# Patient Record
Sex: MALE | Race: WHITE
[De-identification: names, ages, dates, MRNs, and addresses within clinical notes are randomized per-mention and may not be internally consistent; named-entity substitution may affect disease eponyms.]

---

## 2017-08-08 ENCOUNTER — RECORDS - HEALTHEAST (OUTPATIENT)
Dept: ADMINISTRATIVE | Facility: OTHER | Age: 82
End: 2017-08-08

## 2017-08-10 ENCOUNTER — RECORDS - HEALTHEAST (OUTPATIENT)
Dept: ADMINISTRATIVE | Facility: OTHER | Age: 82
End: 2017-08-10

## 2018-06-03 ENCOUNTER — RADIANT APPOINTMENT (OUTPATIENT)
Dept: GENERAL RADIOLOGY | Facility: CLINIC | Age: 83
End: 2018-06-03
Attending: INTERNAL MEDICINE
Payer: MEDICARE

## 2018-06-03 ENCOUNTER — OFFICE VISIT (OUTPATIENT)
Dept: URGENT CARE | Facility: URGENT CARE | Age: 83
End: 2018-06-03
Payer: MEDICARE

## 2018-06-03 VITALS
TEMPERATURE: 97.9 F | DIASTOLIC BLOOD PRESSURE: 100 MMHG | BODY MASS INDEX: 23.56 KG/M2 | SYSTOLIC BLOOD PRESSURE: 160 MMHG | RESPIRATION RATE: 16 BRPM | WEIGHT: 120 LBS | HEART RATE: 110 BPM | OXYGEN SATURATION: 99 % | HEIGHT: 60 IN

## 2018-06-03 DIAGNOSIS — I10 UNCONTROLLED HYPERTENSION: ICD-10-CM

## 2018-06-03 DIAGNOSIS — M79.672 LEFT FOOT PAIN: ICD-10-CM

## 2018-06-03 DIAGNOSIS — T14.8XXA BRUISING: ICD-10-CM

## 2018-06-03 DIAGNOSIS — M79.672 LEFT FOOT PAIN: Primary | ICD-10-CM

## 2018-06-03 PROCEDURE — 99203 OFFICE O/P NEW LOW 30 MIN: CPT | Performed by: INTERNAL MEDICINE

## 2018-06-03 PROCEDURE — 73630 X-RAY EXAM OF FOOT: CPT | Mod: LT

## 2018-06-03 ASSESSMENT — ENCOUNTER SYMPTOMS: FEVER: 0

## 2018-06-03 NOTE — MR AVS SNAPSHOT
After Visit Summary   6/3/2018    Uri Villanueva    MRN: [id number]           Patient Information     Date Of Birth          5/22/1922        Visit Information        Provider Department      6/3/2018 2:40 PM Aura Fischer MD [facility]        Today's Diagnoses     Left foot pain    -  1    Bruising        Uncontrolled hypertension          Care Instructions    Examination of foot is consistent with foot injury.  You have swelling and bruising of your full foot with more dependent purple bruising at areas of gravity.  Aspirin makes it easier for you to bruise with injuries  I would shorten  daily walks over the next few weeks to help get over the foot pain I noted on your exam.    X-ray - no obvious fracture   Radiology review pending    You may hold  aspirin & fish oil for 1 week  Recheck with your primary caregiver if you not improving as expected  Purple bruising and discoloration should fade to yellow and green over the next few weeks          Foot Contusion  You have a contusion. This is also called a bruise. There is swelling and some bleeding under the skin, but no broken bones. This injury generally takes a few days to a few weeks to heal.  During that time, the bruise will typically change in color from reddish, to purple-blue, to greenish-yellow, then to yellow-brown.  Home care    Elevate the foot to reduce pain and swelling. As much as possible, sit or lie down with the foot raised about the level of your heart. This is especially important during the first 48 hours.    Ice the foot to help reduce pain and swelling. Wrap a cold source (ice pack or ice cubes in a plastic bag) in a thin towel. Apply to the bruised area for 20 minutes every 1 to 2 hours the first day. Continue this 3 to 4 times a day until the pain and swelling goes away.    Unless another medicine was prescribed, you can take acetaminophen, ibuprofen, or naproxen to control pain. (If you have  chronic liver or kidney disease or ever had a stomach ulcer or gastrointestinal bleeding, talk with your healthcare provider before using these medicines.)  Follow up  Follow up with your healthcare provider or our staff as advised. Call if you are not improving within 1 to 2 weeks.  When to seek medical advice   Call your healthcare provider right away if you have any of the following:    Increased pain or swelling    Foot or leg becomes cold, blue, numb or tingly    Signs of infection: Warmth, drainage, or increased redness or pain around the bruise    Inability to move the injured foot     Frequent bruising for unknown reasons  Date Last Reviewed: 2/1/2017 2000-2017 [employer]. [address] [place], [place] [id number]. All rights reserved. This information is not intended as a substitute for professional medical care. Always follow your healthcare professional's instructions.                Follow-ups after your visit        Who to contact     If you have questions or need follow up information about today's clinic visit or your schedule please contact [facility] directly at [phone].  Normal or non-critical lab and imaging results will be communicated to you by MyChart, letter or phone within 4 business days after the clinic has received the results. If you do not hear from us within 7 days, please contact the clinic through [employer] or phone. If you have a critical or abnormal lab result, we will notify you by phone as soon as possible.  Submit refill requests through [employer] or call your pharmacy and they will forward the refill request to us. Please allow 3 business days for your refill to be completed.          Additional Information About Your Visit        [employer] Information     [employer] lets you send messages to your doctor, view your test results, renew your prescriptions, schedule appointments and more. To sign up, go to [email or web address] . Click on  "\"Log in\" on the left side of the screen, which will take you to the Welcome page. Then click on \"Sign up Now\" on the right side of the page.     You will be asked to enter the access code listed below, as well as some personal information. Please follow the directions to create your username and password.     Your access code is: [email or web address]  Expires: 2018  4:07 PM     Your access code will  in 90 days. If you need help or a new code, please call your [facility] clinic or [phone].        Care EveryWhere ID     This is your Care EveryWhere ID. This could be used by other organizations to access your [facility] medical records  [id number]        Your Vitals Were     Pulse Temperature Respirations Height Pulse Oximetry BMI (Body Mass Index)    110 97.9  F (36.6  C) (Oral) 16 5' (1.524 m) 99% 23.44 kg/m2       Blood Pressure from Last 3 Encounters:   18 (!) 160/100   02/15/14 128/80    Weight from Last 3 Encounters:   18 120 lb (54.4 kg)               Primary Care Provider Office Phone # Fax #    Adolfo Nella [phone]       [address] [address]        Equal Access to Services     ROSALBA ARORA : Hadii aad ku hadasho Soomaali, waaxda luqadaha, qaybta kaalmada adeegyada, jessica resendiz. So [facility] [phone].    ATENCIÓN: Si habla español, tiene a barrera disposición servicios gratuitos de asistencia lingüística. Denise al [phone].    We comply with applicable federal civil rights laws and [place] laws. We do not discriminate on the basis of race, color, national origin, age, disability, sex, sexual orientation, or gender identity.            Thank you!     Thank you for choosing [facility]  for your care. Our goal is always to provide you with excellent care. Hearing back from our patients is one way we can continue to improve our services. Please take a few minutes to complete the " written survey that you may receive in the mail after your visit with us. Thank you!             Your Updated Medication List - Protect others around you: Learn how to safely use, store and throw away your medicines at www.disposemymeds.org.          This list is accurate as of 6/3/18  4:07 PM.  Always use your most recent med list.                   Brand Name Dispense Instructions for use Diagnosis    AMLODIPINE BESYLATE PO           aspirin 81 MG chewable tablet      Take 81 mg by mouth daily        DOXAZOSIN MESYLATE PO           hydrochlorothiazide 10 mg/mL Susp      Take by mouth daily        ISOSORBIDE OR           LOPERAMIDE HCL PO           METOPROLOL TARTRATE PO           XALATAN 0.005 % ophthalmic solution   Generic drug:  latanoprost      1 drop At Bedtime

## 2018-06-03 NOTE — PATIENT INSTRUCTIONS
Examination of foot is consistent with foot injury.  You have swelling and bruising of your full foot with more dependent purple bruising at areas of gravity.  Aspirin makes it easier for you to bruise with injuries  I would shorten  daily walks over the next few weeks to help get over the foot pain I noted on your exam.    X-ray - no obvious fracture   Radiology review pending    You may hold  aspirin & fish oil for 1 week  Recheck with your primary caregiver if you not improving as expected  Purple bruising and discoloration should fade to yellow and green over the next few weeks          Foot Contusion  You have a contusion. This is also called a bruise. There is swelling and some bleeding under the skin, but no broken bones. This injury generally takes a few days to a few weeks to heal.  During that time, the bruise will typically change in color from reddish, to purple-blue, to greenish-yellow, then to yellow-brown.  Home care    Elevate the foot to reduce pain and swelling. As much as possible, sit or lie down with the foot raised about the level of your heart. This is especially important during the first 48 hours.    Ice the foot to help reduce pain and swelling. Wrap a cold source (ice pack or ice cubes in a plastic bag) in a thin towel. Apply to the bruised area for 20 minutes every 1 to 2 hours the first day. Continue this 3 to 4 times a day until the pain and swelling goes away.    Unless another medicine was prescribed, you can take acetaminophen, ibuprofen, or naproxen to control pain. (If you have chronic liver or kidney disease or ever had a stomach ulcer or gastrointestinal bleeding, talk with your healthcare provider before using these medicines.)  Follow up  Follow up with your healthcare provider or our staff as advised. Call if you are not improving within 1 to 2 weeks.  When to seek medical advice   Call your healthcare provider right away if you have any of the following:    Increased pain or  swelling    Foot or leg becomes cold, blue, numb or tingly    Signs of infection: Warmth, drainage, or increased redness or pain around the bruise    Inability to move the injured foot     Frequent bruising for unknown reasons  Date Last Reviewed: 2/1/2017 2000-2017 [employer] [address] [place] [address]. All rights reserved. This information is not intended as a substitute for professional medical care. Always follow your healthcare professional's instructions.

## 2018-06-03 NOTE — PROGRESS NOTES
SUBJECTIVE:   Uri Villanueva is a 89 year old male presenting with a chief complaint of   Chief Complaint   Patient presents with     Urgent Care     Derm Problem     Musculoskeletal Problem     concer of foot turning black.        He is a new patient of [facility].      Woke up today and noticed blackness on left foot  Walked 1 hour today with wife.  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       Past Medical History:   Diagnosis Date     BPH (benign prostatic hyperplasia)      Hypertension      Thyroid disease      No family history on file.  Current Outpatient Prescriptions   Medication Sig Dispense Refill     AMLODIPINE BESYLATE PO        aspirin 81 MG chewable tablet Take 81 mg by mouth daily       DOXAZOSIN MESYLATE PO        hydrochlorothiazide 10 mg/mL SUSP Take by mouth daily       ISOSORBIDE OR        latanoprost (XALATAN) 0.005 % ophthalmic solution 1 drop At Bedtime       LOPERAMIDE HCL PO        METOPROLOL TARTRATE PO        Social History   Substance Use Topics     Smoking status: Never Smoker     Smokeless tobacco: Never Used     Alcohol use Not on file       OBJECTIVE  BP (!) 160/100  Pulse 110  Temp 97.9  F (36.6  C) (Oral)  Resp 16  Ht 5' (1.524 m)  Wt 120 lb (54.4 kg)  SpO2 99%  BMI 23.44 kg/m2    Physical Exam   Constitutional: He appears well-developed and well-nourished.   Musculoskeletal:   Examination bilateral lower extremities  Right foot normal-appearing  Left foot -swelling noted across full foot.  I am able to leave finger markings with palpation.  Good pedal pulses present  Slight greenish discoloration noted dorsum   Deep purple discoloration noted at webbing between first and second, second and third and third and fourth MCT joints.   Purple discoloration noted on proximal second third and fourth toe digit.  Distal coloration of all toe digits is pink with good cap refill.  Purple discoloration  Noted lateral edge of foot and also medially  There is no significant tenderness noted of the toe digits.  There is significant pain reproduced with patient pulling his foot away with palpation over all metatarsals and carpal bones.     Vitals reviewed.      Labs:  No results found for this or any previous visit (from the past 24 hour(s)).    X-Ray was done, my findings are: no fracture     ASSESSMENT:      ICD-10-CM    1. Left foot pain M79.672 XR Foot Left G/E 3 Views   2. Bruising T14.8XXA XR Foot Left G/E 3 Views   3. Uncontrolled hypertension I10         Medical Decision Making:    Differential Diagnosis:  Initial concern of course was for gangrene.  Exam not consistent with gangrene and he has strong pedal pulses and cap refill  MS Injury Pain: sprain, fracture and contusion    Serious Comorbid Conditions:  Adult:  htn    Daughter states that this is good push blood pressure for him and that his primary caregiver is okay with these numbers.  Usually systolic may be 200 other clinic      PLAN:    Followup:    If not improving or if condition worsens, follow up with your Primary Care Provider    Patient Instructions   Examination of foot is consistent with foot injury.  You have swelling and bruising of your full foot with more dependent purple bruising at areas of gravity.  Aspirin makes it easier for you to bruise with injuries  X-ray     You may hold  aspirin for 1 week  Recheck with your primary caregiver if you not improving as expected  Purple bruising and discoloration should fade to yellow and green over the next few weeks          Foot Contusion  You have a contusion. This is also called a bruise. There is swelling and some bleeding under the skin, but no broken bones. This injury generally takes a few days to a few weeks to heal.  During  that time, the bruise will typically change in color from reddish, to purple-blue, to greenish-yellow, then to yellow-brown.  Home care    Elevate the foot to reduce pain and swelling. As much as possible, sit or lie down with the foot raised about the level of your heart. This is especially important during the first 48 hours.    Ice the foot to help reduce pain and swelling. Wrap a cold source (ice pack or ice cubes in a plastic bag) in a thin towel. Apply to the bruised area for 20 minutes every 1 to 2 hours the first day. Continue this 3 to 4 times a day until the pain and swelling goes away.    Unless another medicine was prescribed, you can take acetaminophen, ibuprofen, or naproxen to control pain. (If you have chronic liver or kidney disease or ever had a stomach ulcer or gastrointestinal bleeding, talk with your healthcare provider before using these medicines.)  Follow up  Follow up with your healthcare provider or our staff as advised. Call if you are not improving within 1 to 2 weeks.  When to seek medical advice   Call your healthcare provider right away if you have any of the following:    Increased pain or swelling    Foot or leg becomes cold, blue, numb or tingly    Signs of infection: Warmth, drainage, or increased redness or pain around the bruise    Inability to move the injured foot     Frequent bruising for unknown reasons  Date Last Reviewed: 2/1/2017 2000-2017 [employer]. [address] [place] [address]. All rights reserved. This information is not intended as a substitute for professional medical care. Always follow your healthcare professional's instructions.

## 2018-12-21 ENCOUNTER — OFFICE VISIT (OUTPATIENT)
Dept: URGENT CARE | Facility: URGENT CARE | Age: 83
End: 2018-12-21
Payer: MEDICARE

## 2018-12-21 VITALS
SYSTOLIC BLOOD PRESSURE: 141 MMHG | TEMPERATURE: 98.1 F | HEIGHT: 60 IN | WEIGHT: 120 LBS | DIASTOLIC BLOOD PRESSURE: 80 MMHG | BODY MASS INDEX: 23.56 KG/M2 | HEART RATE: 81 BPM | OXYGEN SATURATION: 97 %

## 2018-12-21 DIAGNOSIS — N41.0 PROSTATITIS, ACUTE: Primary | ICD-10-CM

## 2018-12-21 DIAGNOSIS — R31.0 GROSS HEMATURIA: ICD-10-CM

## 2018-12-21 LAB
ALBUMIN UR-MCNC: 100 MG/DL
APPEARANCE UR: ABNORMAL
BILIRUB UR QL STRIP: NEGATIVE
COLOR UR AUTO: ABNORMAL
GLUCOSE UR STRIP-MCNC: NEGATIVE MG/DL
HGB UR QL STRIP: ABNORMAL
KETONES UR STRIP-MCNC: NEGATIVE MG/DL
LEUKOCYTE ESTERASE UR QL STRIP: NEGATIVE
NITRATE UR QL: NEGATIVE
PH UR STRIP: 7 PH (ref 5–7)
RBC #/AREA URNS AUTO: >100 /HPF
SOURCE: ABNORMAL
SP GR UR STRIP: 1.02 (ref 1–1.03)
UROBILINOGEN UR STRIP-ACNC: 1 EU/DL (ref 0.2–1)
WBC #/AREA URNS AUTO: ABNORMAL /HPF

## 2018-12-21 PROCEDURE — 81001 URINALYSIS AUTO W/SCOPE: CPT | Performed by: FAMILY MEDICINE

## 2018-12-21 PROCEDURE — 99213 OFFICE O/P EST LOW 20 MIN: CPT | Performed by: FAMILY MEDICINE

## 2018-12-21 RX ORDER — SULFAMETHOXAZOLE/TRIMETHOPRIM 800-160 MG
1 TABLET ORAL 2 TIMES DAILY
Qty: 14 TABLET | Refills: 0 | Status: SHIPPED | OUTPATIENT
Start: 2018-12-21 | End: 2018-12-28

## 2018-12-21 ASSESSMENT — MIFFLIN-ST. JEOR: SCORE: 1021.82

## 2018-12-22 NOTE — PROGRESS NOTES
Subjective: Patient with a long history of enlarged prostate, was supposed to have surgery 25 years ago but did not do it, was seen in March at [facility] because of hematuria, and they decided not to pursue any invasive tests because he is 89.  He has some mild dementia.  He is here with his daughter today but he lives with his wife who says that his urine turned pink and he is urinating more frequently.  He does not speak English, daughter helped with discussing    Objective: Urine is positive with blood, no white blood cells.  Genital exam with a very large testicles, likely bilateral hydroceles, urethral opening looks normal, rectal exam with very enlarged prostate, possibly tender but since I examined him without his daughter in the room but it is difficult to tell.  She asked him later if it seemed unusually painful and he did not think it was but his memory is poor.    Assessment and plan: Hematuria which is not new but frequency may be new today and he might have a tender prostate.  Daughter thinks the plan still would be no kind of workup at this point but it is possible that he has some mild prostatitis and would be helped with Septra for a week, will give that a try but if it does not get better he can follow-up with his regular doctor.

## 2020-01-03 ENCOUNTER — RECORDS - HEALTHEAST (OUTPATIENT)
Dept: LAB | Facility: CLINIC | Age: 85
End: 2020-01-03

## 2020-01-04 LAB — BACTERIA SPEC CULT: NORMAL

## 2020-01-06 ENCOUNTER — RECORDS - HEALTHEAST (OUTPATIENT)
Dept: LAB | Facility: CLINIC | Age: 85
End: 2020-01-06

## 2020-01-06 LAB
ALBUMIN UR-MCNC: ABNORMAL MG/DL
APPEARANCE UR: ABNORMAL
BACTERIA #/AREA URNS HPF: ABNORMAL HPF
BILIRUB UR QL STRIP: NEGATIVE
COLOR UR AUTO: ABNORMAL
GLUCOSE UR STRIP-MCNC: NEGATIVE MG/DL
HGB UR QL STRIP: ABNORMAL
KETONES UR STRIP-MCNC: NEGATIVE MG/DL
LEUKOCYTE ESTERASE UR QL STRIP: ABNORMAL
NITRATE UR QL: NEGATIVE
PH UR STRIP: 6 [PH] (ref 4.5–8)
RBC #/AREA URNS AUTO: >100 HPF
SP GR UR STRIP: 1.03 (ref 1–1.03)
SQUAMOUS #/AREA URNS AUTO: ABNORMAL LPF
UROBILINOGEN UR STRIP-ACNC: ABNORMAL
WBC #/AREA URNS AUTO: >100 HPF
WBC CLUMPS #/AREA URNS HPF: PRESENT /[HPF]

## 2020-01-08 LAB — BACTERIA SPEC CULT: NO GROWTH
